# Patient Record
Sex: MALE | Race: WHITE | NOT HISPANIC OR LATINO | URBAN - METROPOLITAN AREA
[De-identification: names, ages, dates, MRNs, and addresses within clinical notes are randomized per-mention and may not be internally consistent; named-entity substitution may affect disease eponyms.]

---

## 2022-05-24 ENCOUNTER — HOSPITAL ENCOUNTER (EMERGENCY)
Facility: HOSPITAL | Age: 22
Discharge: HOME | End: 2022-05-25
Attending: FAMILY MEDICINE
Payer: MEDICAID

## 2022-05-24 DIAGNOSIS — G43.819 OTHER MIGRAINE WITHOUT STATUS MIGRAINOSUS, INTRACTABLE: Primary | ICD-10-CM

## 2022-05-24 DIAGNOSIS — Z20.822 COVID-19 VIRUS NOT DETECTED: ICD-10-CM

## 2022-05-24 PROCEDURE — 99284 EMERGENCY DEPT VISIT MOD MDM: CPT

## 2022-05-24 RX ORDER — IBUPROFEN 800 MG/1
800 TABLET ORAL
Status: COMPLETED | OUTPATIENT
Start: 2022-05-25 | End: 2022-05-25

## 2022-05-24 RX ORDER — PROMETHAZINE HYDROCHLORIDE 25 MG/1
25 TABLET ORAL
Status: COMPLETED | OUTPATIENT
Start: 2022-05-25 | End: 2022-05-25

## 2022-05-24 RX ORDER — ACETAMINOPHEN 500 MG
1000 TABLET ORAL
Status: COMPLETED | OUTPATIENT
Start: 2022-05-25 | End: 2022-05-25

## 2022-05-25 VITALS
RESPIRATION RATE: 18 BRPM | DIASTOLIC BLOOD PRESSURE: 74 MMHG | TEMPERATURE: 98 F | OXYGEN SATURATION: 98 % | HEART RATE: 84 BPM | SYSTOLIC BLOOD PRESSURE: 140 MMHG | WEIGHT: 203.94 LBS

## 2022-05-25 LAB
INFLUENZA A, MOLECULAR: NEGATIVE
INFLUENZA B, MOLECULAR: NEGATIVE
SARS-COV-2 RDRP RESP QL NAA+PROBE: NEGATIVE
SPECIMEN SOURCE: NORMAL

## 2022-05-25 PROCEDURE — 87502 INFLUENZA DNA AMP PROBE: CPT | Performed by: FAMILY MEDICINE

## 2022-05-25 PROCEDURE — 25000003 PHARM REV CODE 250: Performed by: FAMILY MEDICINE

## 2022-05-25 PROCEDURE — U0002 COVID-19 LAB TEST NON-CDC: HCPCS | Performed by: FAMILY MEDICINE

## 2022-05-25 RX ORDER — BUTALBITAL, ACETAMINOPHEN AND CAFFEINE 50; 325; 40 MG/1; MG/1; MG/1
1 TABLET ORAL EVERY 4 HOURS PRN
Qty: 30 TABLET | Refills: 0 | Status: SHIPPED | OUTPATIENT
Start: 2022-05-25 | End: 2022-06-24

## 2022-05-25 RX ORDER — PROMETHAZINE HYDROCHLORIDE 25 MG/1
25 TABLET ORAL EVERY 6 HOURS PRN
Qty: 15 TABLET | Refills: 0 | Status: SHIPPED | OUTPATIENT
Start: 2022-05-25

## 2022-05-25 RX ADMIN — PROMETHAZINE HYDROCHLORIDE 25 MG: 25 TABLET ORAL at 12:05

## 2022-05-25 RX ADMIN — ACETAMINOPHEN 1000 MG: 500 TABLET ORAL at 12:05

## 2022-05-25 RX ADMIN — IBUPROFEN 800 MG: 800 TABLET, FILM COATED ORAL at 12:05

## 2022-05-25 NOTE — ED PROVIDER NOTES
Encounter Date: 5/24/2022       History     Chief Complaint   Patient presents with    Headache     Pt.  C/o headache since 2030 today.  Pt has hx of migraines, c/o photosensitivity and nausea.       21-year-old white male presents to the ER complaining of headache since 2030 this evening.  Patient has no past medical history for ADHD, epilepsy, hydrocele, hypertension, migraine headaches.  Patient describes a generalized constant throbbing migraine headache without radiation.  Patient rates his current pain a 6/10.  Over-the-counter Excedrin did not improve his pain.  Patient admits to photosensitivity.  Patient denies this being the worst headache of his life.  Patient denies fall, injury, trauma, recent surgery.  Patient also admits to having and nausea, chills.  Patient denies recent travel or known sick contacts.  Patient has been able to tolerate p.o. intake.        Review of patient's allergies indicates:  No Known Allergies  Past Medical History:   Diagnosis Date    ADHD (attention deficit hyperactivity disorder)     Epilepsy     Hydrocele     Hypertension     Intracranial hemorrhage     d/t prematurity    Migraines     Prematurity     Seizures      Past Surgical History:   Procedure Laterality Date    hydrocele      hydrocele repair       Family History   Problem Relation Age of Onset    Hypertension Mother     Hypertension Father     No Known Problems Sister     Asthma Brother     No Known Problems Maternal Aunt     No Known Problems Maternal Uncle     No Known Problems Paternal Aunt     Seizures Paternal Uncle     Hypertension Maternal Grandmother     Cancer Maternal Grandfather     Hypertension Maternal Grandfather     Hypertension Paternal Grandmother     Cancer Paternal Grandfather     Hypertension Paternal Grandfather     ADD / ADHD Neg Hx     Alcohol abuse Neg Hx     Allergies Neg Hx     Autism spectrum disorder Neg Hx     Behavior problems Neg Hx     Birth defects Neg  Hx     Chromosomal disorder Neg Hx     Cleft lip Neg Hx     Congenital heart disease Neg Hx     Depression Neg Hx     Diabetes Neg Hx     Early death Neg Hx     Eczema Neg Hx     Hearing loss Neg Hx     Heart disease Neg Hx     Hyperlipidemia Neg Hx     Kidney disease Neg Hx     Learning disabilities Neg Hx     Mental illness Neg Hx     Migraines Neg Hx     Neurodegenerative disease Neg Hx     Obesity Neg Hx     SIDS Neg Hx     Thyroid disease Neg Hx     Other Neg Hx      Social History     Tobacco Use    Smoking status: Never Smoker   Substance Use Topics    Alcohol use: No     Alcohol/week: 0.0 standard drinks     Review of Systems   Constitutional: Positive for chills. Negative for fever.   HENT: Negative for ear pain, sinus pressure, sinus pain and sore throat.    Eyes: Negative for discharge and visual disturbance.   Respiratory: Negative for cough, chest tightness, shortness of breath and wheezing.    Cardiovascular: Negative for chest pain and palpitations.   Gastrointestinal: Positive for nausea. Negative for abdominal pain, diarrhea and vomiting.   Genitourinary: Negative for difficulty urinating, dysuria, flank pain, frequency and urgency.   Musculoskeletal: Negative for back pain.   Skin: Negative for rash.   Neurological: Positive for headaches. Negative for dizziness, syncope, weakness, light-headedness and numbness.       Physical Exam     Initial Vitals [05/24/22 2249]   BP Pulse Resp Temp SpO2   (!) 171/98 105 16 97.9 °F (36.6 °C) 98 %      MAP       --         Physical Exam    Nursing note and vitals reviewed.  Constitutional: He appears well-developed and well-nourished. He is not diaphoretic. He is active and cooperative.  Non-toxic appearance. He does not have a sickly appearance. He does not appear ill. No distress.   Patient ambulated to ED bed without limitations or assistance.  Patient speaking in full sentences without distress. Patient does not appear to be in pain or  ill.      HENT:   Head: Normocephalic and atraumatic. Head is without raccoon's eyes, without Butterfield's sign, without abrasion, without contusion, without laceration, without right periorbital erythema and without left periorbital erythema.   Right Ear: Hearing, tympanic membrane, external ear and ear canal normal.   Left Ear: Hearing, tympanic membrane, external ear and ear canal normal.   Nose: Nose normal.   Mouth/Throat: Uvula is midline, oropharynx is clear and moist and mucous membranes are normal. No trismus in the jaw. No uvula swelling. No oropharyngeal exudate, posterior oropharyngeal edema, posterior oropharyngeal erythema or tonsillar abscesses.   Eyes: Conjunctivae and EOM are normal. Pupils are equal, round, and reactive to light.   Neck: Neck supple.   Normal range of motion.  Cardiovascular: Normal rate, regular rhythm, normal heart sounds and intact distal pulses.   Pulmonary/Chest: Effort normal and breath sounds normal. No respiratory distress. He has no wheezes. He has no rhonchi. He has no rales.   Abdominal: Abdomen is soft. Bowel sounds are normal. He exhibits no distension. There is no abdominal tenderness. There is no rebound and no guarding.   Musculoskeletal:         General: No tenderness or edema. Normal range of motion.      Cervical back: Normal range of motion and neck supple.     Neurological: He is alert and oriented to person, place, and time. He has normal strength. No cranial nerve deficit. GCS score is 15. GCS eye subscore is 4. GCS verbal subscore is 5. GCS motor subscore is 6.   Skin: Skin is warm. Capillary refill takes less than 2 seconds. No rash noted.   Psychiatric: He has a normal mood and affect.         ED Course   Procedures  Labs Reviewed   INFLUENZA A & B BY MOLECULAR   SARS-COV-2 RNA AMPLIFICATION, QUAL          Imaging Results    None          Medications   acetaminophen tablet 1,000 mg (1,000 mg Oral Given 5/25/22 0003)   ibuprofen tablet 800 mg (800 mg Oral Given  5/25/22 0002)   promethazine tablet 25 mg (25 mg Oral Given 5/25/22 0003)                 ED Course as of 05/25/22 0100   Wed May 25, 2022   0100 Patient is feeling better after medications.  Patient is in stable condition to be discharged home.  ER precautions given to patient who verbalized understanding.  Advised patient to follow-up with primary care provider and to call for an appointment.    [LG]      ED Course User Index  [LG] Jemal Norman III, MD             Clinical Impression:   Final diagnoses:  [G43.819] Other migraine without status migrainosus, intractable (Primary)  [Z20.822] COVID-19 virus not detected          ED Disposition Condition    Discharge Stable        ED Prescriptions     Medication Sig Dispense Start Date End Date Auth. Provider    butalbital-acetaminophen-caffeine -40 mg (FIORICET, ESGIC) -40 mg per tablet Take 1 tablet by mouth every 4 (four) hours as needed for Headaches. 30 tablet 5/25/2022 6/24/2022 Jemal Norman III, MD    promethazine (PHENERGAN) 25 MG tablet Take 1 tablet (25 mg total) by mouth every 6 (six) hours as needed for Nausea. 15 tablet 5/25/2022  Jemal Norman III, MD        Follow-up Information     Follow up With Specialties Details Why Contact Info    Milton Green MD Pediatrics Schedule an appointment as soon as possible for a visit   [address] [facility] [address]  [phone]             Jemal Norman III, MD  05/25/22 0100

## 2024-12-18 ENCOUNTER — HOSPITAL ENCOUNTER (OUTPATIENT)
Dept: RADIOLOGY | Facility: HOSPITAL | Age: 24
Discharge: HOME | End: 2024-12-18
Attending: NURSE PRACTITIONER
Payer: COMMERCIAL

## 2024-12-18 VITALS — WEIGHT: 250 LBS

## 2024-12-18 DIAGNOSIS — R10.9 AP (ABDOMINAL PAIN): ICD-10-CM

## 2024-12-18 PROCEDURE — A9537 TC99M MEBROFENIN: HCPCS | Performed by: RADIOLOGY

## 2024-12-18 PROCEDURE — 78227 HEPATOBIL SYST IMAGE W/DRUG: CPT | Mod: TC

## 2024-12-18 PROCEDURE — 78227 HEPATOBIL SYST IMAGE W/DRUG: CPT | Mod: 26,,, | Performed by: RADIOLOGY

## 2024-12-18 RX ORDER — SINCALIDE 5 UG/5ML
0.02 INJECTION, POWDER, LYOPHILIZED, FOR SOLUTION INTRAVENOUS ONCE
Status: DISCONTINUED | OUTPATIENT
Start: 2024-12-18 | End: 2024-12-19 | Stop reason: HOSPADM

## 2024-12-18 RX ORDER — KIT FOR THE PREPARATION OF TECHNETIUM TC 99M MEBROFENIN 45 MG/10ML
5 INJECTION, POWDER, LYOPHILIZED, FOR SOLUTION INTRAVENOUS
Status: COMPLETED | OUTPATIENT
Start: 2024-12-18 | End: 2024-12-18

## 2024-12-18 RX ADMIN — MEBROFENIN 5 MILLICURIE: 45 INJECTION, POWDER, LYOPHILIZED, FOR SOLUTION INTRAVENOUS at 07:12
